# Patient Record
Sex: FEMALE | Race: WHITE | NOT HISPANIC OR LATINO | URBAN - METROPOLITAN AREA
[De-identification: names, ages, dates, MRNs, and addresses within clinical notes are randomized per-mention and may not be internally consistent; named-entity substitution may affect disease eponyms.]

---

## 2017-06-20 ENCOUNTER — OFFICE VISIT (OUTPATIENT)
Dept: SLEEP MEDICINE | Facility: CLINIC | Age: 37
End: 2017-06-20
Attending: INTERNAL MEDICINE
Payer: COMMERCIAL

## 2017-06-20 VITALS
OXYGEN SATURATION: 97 % | SYSTOLIC BLOOD PRESSURE: 139 MMHG | RESPIRATION RATE: 20 BRPM | BODY MASS INDEX: 47.09 KG/M2 | WEIGHT: 293 LBS | DIASTOLIC BLOOD PRESSURE: 86 MMHG | HEART RATE: 74 BPM | HEIGHT: 66 IN

## 2017-06-20 DIAGNOSIS — E66.01 MORBID OBESITY, UNSPECIFIED OBESITY TYPE (H): Primary | ICD-10-CM

## 2017-06-20 DIAGNOSIS — G47.33 OSA ON CPAP: ICD-10-CM

## 2017-06-20 PROCEDURE — 99211 OFF/OP EST MAY X REQ PHY/QHP: CPT | Mod: ZF

## 2017-06-20 RX ORDER — VENLAFAXINE HYDROCHLORIDE 150 MG/1
150 TABLET, EXTENDED RELEASE ORAL
COMMUNITY

## 2017-06-20 NOTE — MR AVS SNAPSHOT
After Visit Summary   6/20/2017    Daja Tony    MRN: [id number]           Patient Information     Date Of Birth          1980        Visit Information        Provider Department      6/20/2017 10:00 AM Isi Harper MD [facility] [place], Sleep Study        Today's Diagnoses     Morbid obesity, unspecified obesity type (H)    -  1    AGUSTINA on CPAP          Care Instructions      Your BMI is Body mass index is 50.52 kg/(m^2).  Weight management is a personal decision.  If you are interested in exploring weight loss strategies, the following discussion covers the approaches that may be successful. Body mass index (BMI) is one way to tell whether you are at a healthy weight, overweight, or obese. It measures your weight in relation to your height.  A BMI of 18.5 to 24.9 is in the healthy range. A person with a BMI of 25 to 29.9 is considered overweight, and someone with a BMI of 30 or greater is considered obese. More than two-thirds of American adults are considered overweight or obese.  Being overweight or obese increases the risk for further weight gain. Excess weight may lead to heart disease and diabetes.  Creating and following plans for healthy eating and physical activity may help you improve your health.  Weight control is part of healthy lifestyle and includes exercise, emotional health, and healthy eating habits. Careful eating habits lifelong are the mainstay of weight control. Though there are significant health benefits from weight loss, long-term weight loss with diet alone may be very difficult to achieve- studies show long-term success with dietary management in less than 10% of people. Attaining a healthy weight may be especially difficult to achieve in those with severe obesity. In some cases, medications, devices and surgical management might be considered.  What can you do?  If you are overweight or obese and are interested in methods for weight loss,  you should discuss this with your provider.     Consider reducing daily calorie intake by 500 calories.     Keep a food journal.     Avoiding skipping meals, consider cutting portions instead.    Diet combined with exercise helps maintain muscle while optimizing fat loss. Strength training is particularly important for building and maintaining muscle mass. Exercise helps reduce stress, increase energy, and improves fitness. Increasing exercise without diet control, however, may not burn enough calories to loose weight.       Start walking three days a week 10-20 minutes at a time    Work towards walking thirty minutes five days a week     Eventually, increase the speed of your walking for 1-2 minutes at time    In addition, we recommend that you review healthy lifestyles and methods for weight loss available through the National Institutes of Health patient information sites:  http://win.niddk.nih.gov/publications/index.htm    And look into health and wellness programs that may be available through your health insurance provider, employer, local community center, or shavonne club.    Weight management plan: Patient was referred to their PCP to discuss a diet and exercise plan.   Insomnia - Delayed Sleep Phase  Each of us has a built in tendency to find it easier to stay up late at night or get up early in the morning.  Being a  night owl  or  early bird  is a function of our internal body clock.  When you are forced to keep a sleep schedule that goes against your typical habits (because a job or other responsibilities) insomnia can be the result.  Try the following changes to see if you can improve your sleep patterns:    Pick a sleep and wake schedule with about 7-8 hours in bed that is consistent.  That means keep the same bedtime and rise time every day of the week including the weekends, for the next 4-6 weeks    Try to get outside for about 30 minutes after you get up in the morning if the sun is out.  Sunlight is  the best way to  set  your internal body clock      Take melatonin - 1 mg about 5 hours before your habitual bedtime     Please keep a sleep log or diary during this time to track your sleep patterns  Please do not drive if drowsy or sleepy;  pull over if drowsy.              Follow-ups after your visit        Additional Services     BARIATRIC ADULT REFERRAL       Your provider has referred you to: [facility]: Lifestyle Medicine Program for Weight Management [facility] [phone]   [email or web address]    Please be aware that coverage of these services is subject to the terms and limitations of your health insurance plan.  Call member services at your health plan with any benefit or coverage questions.      Please bring the following with you to your appointment:      (1) List of current medications   (2) This referral request   (3) Any documents/labs given to you for this referral                  Follow-up notes from your care team     Return in about 1 year (around 6/20/2018).      Your next 10 appointments already scheduled     Jun 26, 2017 11:30 AM CDT   Oximetry  with SLEEP STUDY RM 7   [facility]    [address] [place] [id number] [phone]              Future tests that were ordered for you today     Open Future Orders        Priority Expected Expires Ordered    Overnight oximetry study Routine  12/17/2017 6/20/2017            Who to contact     If you have questions or need follow up information about today's clinic visit or your schedule please contact [facility] directly at [phone].  Normal or non-critical lab and imaging results will be communicated to you by MyChart, letter or phone within 4 business days after the clinic has received the results. If you do not hear from us within 7 days, please contact the clinic through  "[employer] or phone. If you have a critical or abnormal lab result, we will notify you by phone as soon as possible.  Submit refill requests through [employer] or call your pharmacy and they will forward the refill request to us. Please allow 3 business days for your refill to be completed.          Additional Information About Your Visit        [employer] Information     [employer] lets you send messages to your doctor, view your test results, renew your prescriptions, schedule appointments and more. To sign up, go to [email or web address] . Click on \"Log in\" on the left side of the screen, which will take you to the Welcome page. Then click on \"Sign up Now\" on the right side of the page.     You will be asked to enter the access code listed below, as well as some personal information. Please follow the directions to create your username and password.     Your access code is: [email or web address]  Expires: 2017 10:55 PM     Your access code will  in 90 days. If you need help or a new code, please call your [place] clinic or [phone].        Care EveryWhere ID     This is your Care EveryWhere ID. This could be used by other organizations to access your [place] medical records  [id number]        Your Vitals Were     Pulse Respirations Height Pulse Oximetry BMI (Body Mass Index)       74 20 1.676 m (5' 6\") 97% 50.52 kg/m2        Blood Pressure from Last 3 Encounters:   17 139/86   05/18/15 131/79   03/04/15 124/84    Weight from Last 3 Encounters:   17 (!) 142 kg (313 lb)   03/04/15 130.6 kg (288 lb)   12 117.9 kg (260 lb)              We Performed the Following     BARIATRIC ADULT REFERRAL     Comprehensive DME          Today's Medication Changes          These changes are accurate as of: 17 10:55 PM.  If you have any questions, ask your nurse or doctor.               Stop taking these medicines if you haven't already. Please contact your care team if you have questions.     cholecalciferol " 83944 UNITS capsule   Commonly known as:  VITAMIN D3           fluticasone 50 MCG/ACT spray   Commonly known as:  FLONASE           PROZAC PO                    Primary Care Provider Office Phone # Fax #    Brian Wilkins [phone]       [facility] [address]  [facility] [address]        Thank you!     Thank you for choosing [facility] [place], SLEEP STUDY  for your care. Our goal is always to provide you with excellent care. Hearing back from our patients is one way we can continue to improve our services. Please take a few minutes to complete the written survey that you may receive in the mail after your visit with us. Thank you!             Your Updated Medication List - Protect others around you: Learn how to safely use, store and throw away your medicines at www.disposemymeds.org.          This list is accurate as of: 6/20/17 10:55 PM.  Always use your most recent med list.                   Brand Name Dispense Instructions for use    omeprazole 20 MG tablet      Take 40 mg by mouth daily       order for [facility]      Respironics Auto CPAP 8-15 cm H20.       venlafaxine 150 MG Tb24 24 hr tablet    EFFEXOR-ER     Take 150 mg by mouth daily (with breakfast)       XANAX PO      Take 1 mg by mouth daily

## 2017-06-20 NOTE — NURSING NOTE
"Chief Complaint   Patient presents with     Sleep Problem       Initial /86  Pulse 74  Resp 20  Ht 1.676 m (5' 6\")  Wt (!) 142 kg (313 lb)  SpO2 97%  BMI 50.52 kg/m2 Estimated body mass index is 50.52 kg/(m^2) as calculated from the following:    Height as of this encounter: 1.676 m (5' 6\").    Weight as of this encounter: 142 kg (313 lb).  Medication Reconciliation: complete     Drew CMA      "

## 2017-06-20 NOTE — PATIENT INSTRUCTIONS
Your BMI is Body mass index is 50.52 kg/(m^2).  Weight management is a personal decision.  If you are interested in exploring weight loss strategies, the following discussion covers the approaches that may be successful. Body mass index (BMI) is one way to tell whether you are at a healthy weight, overweight, or obese. It measures your weight in relation to your height.  A BMI of 18.5 to 24.9 is in the healthy range. A person with a BMI of 25 to 29.9 is considered overweight, and someone with a BMI of 30 or greater is considered obese. More than two-thirds of American adults are considered overweight or obese.  Being overweight or obese increases the risk for further weight gain. Excess weight may lead to heart disease and diabetes.  Creating and following plans for healthy eating and physical activity may help you improve your health.  Weight control is part of healthy lifestyle and includes exercise, emotional health, and healthy eating habits. Careful eating habits lifelong are the mainstay of weight control. Though there are significant health benefits from weight loss, long-term weight loss with diet alone may be very difficult to achieve- studies show long-term success with dietary management in less than 10% of people. Attaining a healthy weight may be especially difficult to achieve in those with severe obesity. In some cases, medications, devices and surgical management might be considered.  What can you do?  If you are overweight or obese and are interested in methods for weight loss, you should discuss this with your provider.     Consider reducing daily calorie intake by 500 calories.     Keep a food journal.     Avoiding skipping meals, consider cutting portions instead.    Diet combined with exercise helps maintain muscle while optimizing fat loss. Strength training is particularly important for building and maintaining muscle mass. Exercise helps reduce stress, increase energy, and improves fitness.  Increasing exercise without diet control, however, may not burn enough calories to loose weight.       Start walking three days a week 10-20 minutes at a time    Work towards walking thirty minutes five days a week     Eventually, increase the speed of your walking for 1-2 minutes at time    In addition, we recommend that you review healthy lifestyles and methods for weight loss available through the National Institutes of Health patient information sites:  http://win.niddk.nih.gov/publications/index.htm    And look into health and wellness programs that may be available through your health insurance provider, employer, local community center, or [employer] club.    Weight management plan: Patient was referred to their PCP to discuss a diet and exercise plan.   Insomnia - Delayed Sleep Phase  Each of us has a built in tendency to find it easier to stay up late at night or get up early in the morning.  Being a  night owl  or  early bird  is a function of our internal body clock.  When you are forced to keep a sleep schedule that goes against your typical habits (because a job or other responsibilities) insomnia can be the result.  Try the following changes to see if you can improve your sleep patterns:    Pick a sleep and wake schedule with about 7-8 hours in bed that is consistent.  That means keep the same bedtime and rise time every day of the week including the weekends, for the next 4-6 weeks    Try to get outside for about 30 minutes after you get up in the morning if the sun is out.  Sunlight is the best way to  set  your internal body clock      Take melatonin   1 mg about 5 hours before your habitual bedtime     Please keep a sleep log or diary during this time to track your sleep patterns  Please do not drive if drowsy or sleepy;  pull over if drowsy.

## 2017-06-21 NOTE — PROGRESS NOTES
"Sleep Medicine follow up visit note    Date of visit: 17.    CHIEF COMPLAINT: f/u AGUSTINA; CPAP return visit    HPI: Ms. Daja Tony  is a 36 year old  female with AGUSTINA who is at Sleep clinic for CPAP return visit. She was diagnosed with moderate AGUSTINA, AHI 20.2 per hour (no REM during baseline study) after PSG obtained in 3/2015 (she then weighed 288 lbs).  She was subsequently prescribed auto CPAP device pressure settings 8-11 cm of water.  She reports using the CPAP device regularly during sleep ; she  denies any reports of  snoring or  awakenings due to  gasping for air while using the device. She  reports occasional air hunger when she puts the mask on.   She reports  insufficient sleep during work nights and sleep extension during weekends. She denies fatigue/EDS. She occasionally naps during day when she uses CPAP. She denies concerns about drowsiness while driving.  She denies any hospitalizations since she was last seen at Sleep clinic due to Cardiovasular  Disease.    DOWNLOADABLE COMPLIANCE DATA:   From 17 through 17, reveals that she used the device for 30/30 days with an average use of 7 hours and 45 minutes on the days used. Mean pressure:10.3 and 90 th percentile pressure settin.0 cm of water.  Residual AHI was 1.0 per hr.      Current meds, Past medical history, Past surgical history, Allergies, Social history, Family history: reviewed, per EMR      PHYSICAL EXAM: /86  Pulse 74  Resp 20  Ht 1.676 m (5' 6\")  Wt (!) 142 kg (313 lb)  SpO2 97%  BMI 50.52 kg/m2                               General: obese female, pleasant, in no apparent distress                               CVS: regular S1 and S2; no gallops/murmurs                               Resp: vesicular breath sounds; no rales/rhonchi                               Extremities: no pedal edema               ASSESSMENT/PLAN:  1. Moderate AGUSTINA:  Pt reports adequate compliance with CPAP and reports positive benefits from " "continued CPAP usage and based on the compliance DL, the AGUSTINA is adequately controlled with  Auto CPAP at the current settings. Based on the weight gain of 25 lbs since the sleep study and with  reports air hunger, recommended revising the  pressure settings on her Auto CPAP as follows:  min pressure=10 and max pressure=14 cm of water.  She  was instructed to call us if she has any trouble tolerating the revised pressure settings. Prescription was provided for renewal of the CPAP supplies. Recommend obtaining an overnight oximetry with the auto CPAP with revised pressure settings. The results of the oximetry will be communicated to her via phone. If the oximetry is normal, plan is for her to continue using the device regularly during sleep  and to get the supplies replaced  regularly.   2. Delayed sleep phase and insufficient sleep during some week nights. We discussed  consequences of chronic sleep deprivation, we also discussed about regularizing sleep wake schedule, keeping it same every day of the week, aim at obtaining at least 7-8 hours of sleep per night and avoid sleep deprivation. We also  discussed bright light exposure in the morning.  3. Obesity: We discussed weight management with diet and exercise and provided referral to weight loss center in which she was  Interested.  4. Her blood pressure was noted to be elevated.  She  has been advised to follow up with her primary provider.  Patient was counseled on avoiding driving /oprerating heavy machinery  if drowsy or sleepy.  F/U in 1 year or sooner if any concerns.    \"I spent a total of 25 minutes face to face with Daja Tony during today's office visit. Over 50% of this time was spent counseling the patient and coordinating care regarding sleep apnea, regularizing sleep schedule, increasing duration of sleep and weight management.\"    Isi Harper MD   of Medicine,  Division of Pulmonary/Sleep " [facility]  .

## 2017-10-25 ENCOUNTER — NURSE TRIAGE (OUTPATIENT)
Dept: NURSING | Facility: CLINIC | Age: 37
End: 2017-10-25

## 2017-10-25 ENCOUNTER — OFFICE VISIT (OUTPATIENT)
Dept: URGENT CARE | Facility: URGENT CARE | Age: 37
End: 2017-10-25
Payer: COMMERCIAL

## 2017-10-25 VITALS
HEART RATE: 70 BPM | RESPIRATION RATE: 20 BRPM | SYSTOLIC BLOOD PRESSURE: 130 MMHG | TEMPERATURE: 98.5 F | OXYGEN SATURATION: 96 % | BODY MASS INDEX: 50.52 KG/M2 | DIASTOLIC BLOOD PRESSURE: 92 MMHG | WEIGHT: 293 LBS

## 2017-10-25 DIAGNOSIS — R05.9 COUGH: Primary | ICD-10-CM

## 2017-10-25 PROCEDURE — 99203 OFFICE O/P NEW LOW 30 MIN: CPT | Performed by: FAMILY MEDICINE

## 2017-10-25 RX ORDER — CODEINE PHOSPHATE AND GUAIFENESIN 10; 100 MG/5ML; MG/5ML
1-2 SOLUTION ORAL EVERY 6 HOURS PRN
Qty: 120 ML | Refills: 0 | Status: SHIPPED | OUTPATIENT
Start: 2017-10-25

## 2017-10-25 RX ORDER — BENZONATATE 100 MG/1
200 CAPSULE ORAL 3 TIMES DAILY PRN
Qty: 42 CAPSULE | Refills: 3 | Status: SHIPPED | OUTPATIENT
Start: 2017-10-25

## 2017-10-25 RX ORDER — AZITHROMYCIN 250 MG/1
TABLET, FILM COATED ORAL
Qty: 6 TABLET | Refills: 0 | Status: SHIPPED | OUTPATIENT
Start: 2017-10-25

## 2017-10-25 NOTE — NURSING NOTE
"Chief Complaint   Patient presents with     Urgent Care     Cough     persistent cough x 4 days, sob, chest tightness         Initial BP (!) 130/92 (BP Location: Right arm, Patient Position: Chair, Cuff Size: Adult Large)  Pulse 70  Temp 98.5  F (36.9  C) (Oral)  Resp 20  Wt (!) 313 lb (142 kg)  SpO2 96%  BMI 50.52 kg/m2 Estimated body mass index is 50.52 kg/(m^2) as calculated from the following:    Height as of 6/20/17: 5' 6\" (1.676 m).    Weight as of this encounter: 313 lb (142 kg).  Medication Reconciliation: unable or not appropriate to perform   Gita Rodas Medical Assistant      "

## 2017-10-25 NOTE — TELEPHONE ENCOUNTER
Asking if the [facility] does chest X-rays/ called and confirmed that they do/  Declined triage    Ivan Santos RN [phone]

## 2017-10-25 NOTE — PATIENT INSTRUCTIONS
Steam, Humidifier    I recommend that you quit smoking in the future.      follow up with the primary care provider if not better in 5-6 days.     Go to the emergency room if there is worsening shortness of breath.

## 2017-10-25 NOTE — MR AVS SNAPSHOT
"              After Visit Summary   10/25/2017    Daja Tony    MRN: [id number]           Patient Information     Date Of Birth          1980        Visit Information        Provider Department      10/25/2017 11:05 AM Ron Zaragoza MD [facility]        Today's Diagnoses     Cough    -  1      Care Instructions    Steam, Humidifier    I recommend that you quit smoking in the future.      follow up with the primary care provider if not better in 5-6 days.     Go to the emergency room if there is worsening shortness of breath.               Follow-ups after your visit        Who to contact     If you have questions or need follow up information about today's clinic visit or your schedule please contact [facility] directly at [phone].  Normal or non-critical lab and imaging results will be communicated to you by [employer], letter or phone within 4 business days after the clinic has received the results. If you do not hear from us within 7 days, please contact the clinic through [employer] or phone. If you have a critical or abnormal lab result, we will notify you by phone as soon as possible.  Submit refill requests through [employer] or call your pharmacy and they will forward the refill request to us. Please allow 3 business days for your refill to be completed.          Additional Information About Your Visit        [employer] Information     [employer] lets you send messages to your doctor, view your test results, renew your prescriptions, schedule appointments and more. To sign up, go to [email or web address] . Click on \"Log in\" on the left side of the screen, which will take you to the Welcome page. Then click on \"Sign up Now\" on the right side of the page.     You will be asked to enter the access code listed below, as well as some personal information. Please follow the directions to create your username and password.     Your access code is: [email or web address]  Expires: 1/23/2018 " 12:13 PM     Your access code will  in 90 days. If you need help or a new code, please call your [place] clinic or [phone].        Care EveryWhere ID     This is your Care EveryWhere ID. This could be used by other organizations to access your [place] medical records  [id number]        Your Vitals Were     Pulse Temperature Respirations Pulse Oximetry BMI (Body Mass Index)       70 98.5  F (36.9  C) (Oral) 20 96% 50.52 kg/m2        Blood Pressure from Last 3 Encounters:   10/25/17 (!) 130/92   17 139/86   05/18/15 131/79    Weight from Last 3 Encounters:   10/25/17 (!) 313 lb (142 kg)   17 (!) 313 lb (142 kg)   03/04/15 288 lb (130.6 kg)              Today, you had the following     No orders found for display         Today's Medication Changes          These changes are accurate as of: 10/25/17 12:13 PM.  If you have any questions, ask your nurse or doctor.               Start taking these medicines.        Dose/Directions    azithromycin 250 MG tablet   Commonly known as:  ZITHROMAX   Used for:  Cough   Started by:  Ron Zaragoza MD        Two tablets first day, then one tablet daily for four days.   Quantity:  6 tablet   Refills:  0       benzonatate 100 MG capsule   Commonly known as:  TESSALON   Used for:  Cough   Started by:  Ron Zaragoza MD        Dose:  200 mg   Take 2 capsules (200 mg) by mouth 3 times daily as needed   Quantity:  42 capsule   Refills:  3       guaiFENesin-codeine 100-10 MG/5ML Soln solution   Commonly known as:  ROBITUSSIN AC   Used for:  Cough   Started by:  Ron Zaragoza MD        Dose:  1-2 tsp.   Take 5-10 mLs by mouth every 6 hours as needed   Quantity:  120 mL   Refills:  0            Where to get your medicines      [facility] [address] [place] [address] & [address] [address]    Hours:  24-hours Phone:  [phone]     azithromycin 250 MG tablet     benzonatate 100 MG capsule         Some of these will need a paper prescription and others can be bought over the counter.  Ask your nurse if you have questions.     Bring a paper prescription for each of these medications     guaiFENesin-codeine 100-10 MG/5ML Soln solution                Primary Care Provider Office Phone # Fax #    Brian Wilkins [phone]       [facility] [address] [address]        Equal Access to Services     [facility] : Hadii aad ku hadasho Soomaali, waaxda luqadaha, qaybta kaalmada adeegyada, waxay idiin hayaan adeeg khrenettalinnea la'lynsey . So [facility] [phone].    ATENCIÓN: Si habla español, tiene a henriquez disposición servicios gratuitos de asistencia lingüística. [facility] [phone].    We comply with applicable federal civil rights laws and [place] laws. We do not discriminate on the basis of race, color, national origin, age, disability, sex, sexual orientation, or gender identity.            Thank you!     Thank you for choosing [facility]  for your care. Our goal is always to provide you with excellent care. Hearing back from our patients is one way we can continue to improve our services. Please take a few minutes to complete the written survey that you may receive in the mail after your visit with us. Thank you!             Your Updated Medication List - Protect others around you: Learn how to safely use, store and throw away your medicines at www.disposemymeds.org.          This list is accurate as of: 10/25/17 12:13 PM.  Always use your most recent med list.                   Brand Name Dispense Instructions for use Diagnosis    albuterol 108 (90 BASE) MCG/ACT Inhaler    PROAIR HFA/PROVENTIL HFA/VENTOLIN HFA     Inhale 2 puffs into the lungs every 6 hours        azithromycin 250 MG tablet    ZITHROMAX    6 tablet    Two tablets first day, then one tablet daily for four days.    Cough       benzonatate 100 MG  capsule    TESSALON    42 capsule    Take 2 capsules (200 mg) by mouth 3 times daily as needed    Cough       guaiFENesin-codeine 100-10 MG/5ML Soln solution    ROBITUSSIN AC    120 mL    Take 5-10 mLs by mouth every 6 hours as needed    Cough       omeprazole 20 MG tablet      Take 40 mg by mouth daily        order for DME      Respironics Auto CPAP 8-15 cm H20.        venlafaxine 150 MG Tb24 24 hr tablet    EFFEXOR-ER     Take 150 mg by mouth daily (with breakfast)        XANAX PO      Take 1 mg by mouth daily

## 2017-10-25 NOTE — PROGRESS NOTES
SUBJECTIVE:   Daja Tony is a 36 year old female who is a smoker.  She is presenting with a chief complaint of cough (sometimes productive of dark green thick phlegm, especially in the morning, severe coughing attacks often interfering with sleep) for the past four days, shortness of breath (can't breathe deeply) and worseningchest tightness for the past couple days.  Onset of symptoms was three days ago.  Course of illness is still present. .    Current and Associated symptoms: as listed above.   Treatment measures tried include Albuterol MDI (started three days ago). .  Predisposing factors include Patient has smoked since the age of 17 years. .    Patient was evaluated at the [facility] three days ago.  Patient was evaluated for cough.  Patient was given Rx's for Amoxicillin for an ear infection and a Rx for Albuterol MDI.  Patient has noticed temporary improvement for about the first 30-40 minutes after taking the Albuterol MDI    Past Medical History:   Diagnosis Date     Anxiety      Depressive disorder      Current Outpatient Prescriptions   Medication Sig Dispense Refill     albuterol (PROAIR HFA/PROVENTIL HFA/VENTOLIN HFA) 108 (90 BASE) MCG/ACT Inhaler Inhale 2 puffs into the lungs every 6 hours       venlafaxine (EFFEXOR-ER) 150 MG TB24 24 hr tablet Take 150 mg by mouth daily (with breakfast)       ORDER FOR [facility] Respironics Auto CPAP 8-15 cm H20.       omeprazole 20 MG tablet Take 40 mg by mouth daily        ALPRAZolam (XANAX PO) Take 1 mg by mouth daily        Social History   Substance Use Topics     Smoking status: Current Some Day Smoker     Types: Cigarettes     Smokeless tobacco: Never Used      Comment: Smokes rarely     Alcohol use Yes       ROS:  Review of systems negative except as stated above.    OBJECTIVE:  BP (!) 130/92 (BP Location: Right arm, Patient Position: Chair, Cuff Size: Adult Large)  Pulse 70  Temp 98.5  F (36.9  C) (Oral)  Resp 20  Wt (!) 313 lb (142 kg)   SpO2 96%  BMI 50.52 kg/m2  GENERAL APPEARANCE: healthy, alert and no distress  HENT: ear canals and TM's normal.  Nose and mouth without ulcers, erythema or lesions  NECK: supple, nontender, no lymphadenopathy  RESP: lungs clear to auscultation - no rales, rhonchi or wheezes  CV: regular rates and rhythm, normal S1 S2, no murmur noted  SKIN: no suspicious lesions or rashes    ASSESSMENT:  Cough    PLAN:  Rx:  Azithromycin, Tessalon Perles, Cheratussin AC  Continue the Albuterol MDI  follow up with the primary care provider if not better in 5-6 days.   Go to the emergency room if there is worsening shortness of breath   See orders in Epic  I recommended that the patient quit smoking in the future.     Ron Zaragoza MD

## 2018-03-19 DIAGNOSIS — G47.33 OBSTRUCTIVE SLEEP APNEA OF ADULT: Primary | ICD-10-CM

## 2018-07-03 ENCOUNTER — NURSE TRIAGE (OUTPATIENT)
Dept: NURSING | Facility: CLINIC | Age: 38
End: 2018-07-03

## 2018-07-03 NOTE — TELEPHONE ENCOUNTER
"Patient calling stating \" burning sensation for three days\" in her abdomen. Pain intermittent. Rate pain 4/10. No vomiting. Took antacid with minimal relief. Has loose stool.   Per guideline advised patient be seen within 24hours.   Caller verbalized understanding. Denies further questions.      Cyrus Velasco RN  [facility]       Reason for Disposition    [1] MODERATE pain (e.g., interferes with normal activities) AND [2] comes and goes (cramps) AND [3] present > 24 hours  (Exception: pain with Vomiting or Diarrhea - see that Guideline)    Additional Information    Negative: Shock suspected (e.g., cold/pale/clammy skin, too weak to stand, low BP, rapid pulse)    Negative: Difficult to awaken or acting confused  (e.g., disoriented, slurred speech)    Negative: Passed out (i.e., lost consciousness, collapsed and was not responding)    Negative: Sounds like a life-threatening emergency to the triager    Negative: Chest pain    Pain is mainly in upper abdomen  (if needed ask: \"is it mainly above the belly button?\")    Negative: Severe difficulty breathing (e.g., struggling for each breath, speaks in single words)    Negative: Shock suspected (e.g., cold/pale/clammy skin, too weak to stand, low BP, rapid pulse)    Negative: Difficult to awaken or acting confused  (e.g., disoriented, slurred speech)    Negative: Passed out (i.e., lost consciousness, collapsed and was not responding)    Negative: Visible sweat on face or sweat dripping down face    Negative: Sounds like a life-threatening emergency to the triager    Negative: Followed an abdomen (stomach) injury    Negative: Chest pain    Negative: [1] SEVERE pain (e.g., excruciating) AND [2] present > 1 hour    Negative: [1] Pain lasts > 10 minutes AND [2] age > 50    Negative: [1] Pain lasts > 10 minutes AND [2] age > 40 AND [3] associated chest, arm, neck, upper back or jaw pain    Negative: [1] Pain lasts > 10 minutes AND [2] age > 35 AND [3] at least one " "cardiac risk factor (i.e., hypertension, diabetes, obesity, smoker or strong family history of heart disease)    Negative: [1] Pain lasts > 10 minutes AND [2] history of heart disease (i.e., heart attack, bypass surgery, angina, angioplasty, CHF; not just a heart murmur)    Negative: [1] Pain lasts > 10 minutes AND [2] difficulty breathing    Negative: [1] Vomiting AND [2] contains red blood  (Exception: few streaks and only occurred once)    Negative: [1] Vomiting AND [2] contains black (\"coffee ground\") material    Negative: Blood in bowel movements  (Exception: Blood on surface of BM with constipation)    Negative: Black or tarry bowel movements  (Exception: chronic-unchanged  black-grey bowel movements AND is taking iron pills or Pepto-bismol)    Negative: [1] Pregnant > 24 weeks AND [2] hand or face swelling    Negative: Patient sounds very sick or weak to the triager    Negative: [1] MILD-MODERATE pain AND [2] constant AND [3] present > 2 hours    Negative: [1] MILD-MODERATE pain AND [2] not relieved by antacids    Negative: [1] Vomiting AND [2] contains bile (green color)    Negative: [1] Vomiting AND [2] abdomen looks much more swollen than usual    Negative: White of the eyes have turned yellow (i.e., jaundice)    Negative: Fever > 103 F (39.4 C)    Negative: [1] Fever > 101 F (38.3 C) AND [2] age > 60    Negative: [1] Fever > 101 F (38.3 C) AND [2] bedridden (e.g., nursing home patient, CVA, chronic illness, recovering from surgery)    Negative: [1] Fever > 100.5 F (38.1 C) AND [2] diabetes mellitus or weak immune system (e.g., HIV positive, cancer chemo, splenectomy, organ transplant, chronic steroids)    Protocols used: ABDOMINAL PAIN - UPPER-ADULT-AH, ABDOMINAL PAIN - FEMALE-ADULT-AH      "

## 2019-03-26 DIAGNOSIS — G47.33 OBSTRUCTIVE SLEEP APNEA (ADULT) (PEDIATRIC): Primary | ICD-10-CM

## 2019-04-11 NOTE — PROGRESS NOTES
Orders sent to [facility]    Nichole Grijalva [facility] ~[place]

## 2020-07-29 ENCOUNTER — AMBULATORY - HEALTHEAST (OUTPATIENT)
Dept: SURGERY | Facility: CLINIC | Age: 40
End: 2020-07-29

## 2020-07-29 DIAGNOSIS — Z11.59 ENCOUNTER FOR SCREENING FOR OTHER VIRAL DISEASES: ICD-10-CM

## 2020-08-10 ASSESSMENT — MIFFLIN-ST. JEOR: SCORE: 2218.11

## 2020-08-14 ENCOUNTER — ANESTHESIA - HEALTHEAST (OUTPATIENT)
Dept: SURGERY | Facility: CLINIC | Age: 40
End: 2020-08-14

## 2020-08-14 ENCOUNTER — AMBULATORY - HEALTHEAST (OUTPATIENT)
Dept: FAMILY MEDICINE | Facility: CLINIC | Age: 40
End: 2020-08-14

## 2020-08-14 DIAGNOSIS — Z11.59 ENCOUNTER FOR SCREENING FOR OTHER VIRAL DISEASES: ICD-10-CM

## 2020-08-17 ENCOUNTER — SURGERY - HEALTHEAST (OUTPATIENT)
Dept: SURGERY | Facility: CLINIC | Age: 40
End: 2020-08-17

## 2020-08-17 ENCOUNTER — COMMUNICATION - HEALTHEAST (OUTPATIENT)
Dept: SCHEDULING | Facility: CLINIC | Age: 40
End: 2020-08-17

## 2021-06-04 VITALS — WEIGHT: 293 LBS | HEIGHT: 65 IN | BODY MASS INDEX: 48.82 KG/M2

## 2021-06-10 NOTE — ANESTHESIA PREPROCEDURE EVALUATION
Anesthesia Evaluation      Patient summary reviewed   No history of anesthetic complications     Airway   Mallampati: III  Neck ROM: full   Pulmonary     breath sounds clear to auscultation  (+) asthma  mild,  well controlled, sleep apnea on CPAP, , a smoker                         Cardiovascular   (-) hypertension  Rhythm: regular  Rate: normal,      ROS comment: Limited mobility since injury, prior to completed > 4 METs     Neuro/Psych    (+) depression, anxiety/panic attacks,   (-) no seizures    Endo/Other    (+) obesity (BMI 56.5),   (-) no diabetes, hypothyroidism, not pregnant     GI/Hepatic/Renal    (+) GERD well controlled,        Other findings:   Covid negative 8/14          Dental - normal exam                        Anesthesia Plan  Planned anesthetic: general endotracheal  Ramp patient, Glidescope  Propofol gtt background   Acetaminophen 1 g (pre-op), Ketamine 50 mg on induction, ketorolac 15 mg if ok w/ surgeon  Scopolamine, dexamethasone 10 mg, ondansetron 4 mg   ASA 3   Induction: intravenous   Anesthetic plan and risks discussed with: patient and parent/guardian  Anesthesia plan special considerations: video-assisted, antiemetics,   Post-op plan: routine recovery

## 2021-06-10 NOTE — ANESTHESIA CARE TRANSFER NOTE
Last vitals:   Vitals:    08/17/20 0828   BP: (!) 164/94   Pulse: 94   Resp: 16   Temp: 36.7  C (98  F)   SpO2: 100%     Patient's level of consciousness is awake and drowsy  Spontaneous respirations: yes  Maintains airway independently: yes  Dentition unchanged: yes  Oropharynx: oropharynx clear of all foreign objects    QCDR Measures:  ASA# 20 - Surgical Safety Checklist: WHO surgical safety checklist completed prior to induction    PQRS# 430 - Adult PONV Prevention: 4558F - Pt received => 2 anti-emetic agents (different classes) preop & intraop  ASA# 8 - Peds PONV Prevention: NA - Not pediatric patient, not GA or 2 or more risk factors NOT present  PQRS# 424 - Dayana-op Temp Management: 4559F - At least one body temp DOCUMENTED => 35.5C or 95.9F within required timeframe  PQRS# 426 - PACU Transfer Protocol: - Transfer of care checklist used  ASA# 14 - Acute Post-op Pain: ASA14B - Patient did NOT experience pain >= 7 out of 10

## 2021-06-10 NOTE — ANESTHESIA POSTPROCEDURE EVALUATION
Patient: Daja Tony  Procedure(s):  LEFT KNEE ARTHROSCOPY WITH PARTIAL MEDIAL MENISCECTOMY (Left)  Anesthesia type: general    Patient location: PACU  Last vitals:   Vitals Value Taken Time   /65 8/17/2020 10:00 AM   Temp 36  C (96.8  F) 8/17/2020 10:00 AM   Pulse 96 8/17/2020 10:01 AM   Resp 16 8/17/2020 10:00 AM   SpO2 95 % 8/17/2020 10:01 AM   Vitals shown include unvalidated device data.  Post vital signs: stable  Level of consciousness: awake and responds to simple questions  Post-anesthesia pain: pain controlled  Post-anesthesia nausea and vomiting: no  Pulmonary: unassisted, return to baseline  Cardiovascular: stable and blood pressure at baseline  Hydration: adequate  Anesthetic events: no    QCDR Measures:  ASA# 11 - Dayana-op Cardiac Arrest: ASA11B - Patient did NOT experience unanticipated cardiac arrest  ASA# 12 - Dayana-op Mortality Rate: ASA12B - Patient did NOT die  ASA# 13 - PACU Re-Intubation Rate: ASA13B - Patient did NOT require a new airway mgmt  ASA# 10 - Composite Anes Safety: ASA10A - No serious adverse event    Additional Notes:

## 2021-11-30 PROBLEM — F41.9 ANXIETY: Status: ACTIVE | Noted: 2021-11-30

## 2025-06-11 ENCOUNTER — HOSPITAL ENCOUNTER (EMERGENCY)
Facility: CLINIC | Age: 45
Discharge: HOME | End: 2025-06-11
Payer: COMMERCIAL

## 2025-06-11 VITALS
SYSTOLIC BLOOD PRESSURE: 142 MMHG | WEIGHT: 293 LBS | HEART RATE: 98 BPM | DIASTOLIC BLOOD PRESSURE: 67 MMHG | HEIGHT: 65 IN | OXYGEN SATURATION: 94 % | BODY MASS INDEX: 48.82 KG/M2 | TEMPERATURE: 98.5 F | RESPIRATION RATE: 19 BRPM

## 2025-06-11 DIAGNOSIS — I10 ELEVATED BLOOD PRESSURE READING WITH DIAGNOSIS OF HYPERTENSION: ICD-10-CM

## 2025-06-11 DIAGNOSIS — R00.2 PALPITATIONS: ICD-10-CM

## 2025-06-11 LAB
ANION GAP SERPL CALCULATED.3IONS-SCNC: 13 MMOL/L (ref 7–15)
ATRIAL RATE - MUSE: 117 BPM
BASOPHILS # BLD AUTO: 0.1 10E3/UL (ref 0–0.2)
BASOPHILS NFR BLD AUTO: 1 %
BUN SERPL-MCNC: 9.6 MG/DL (ref 6–20)
CALCIUM SERPL-MCNC: 9.2 MG/DL (ref 8.8–10.4)
CHLORIDE SERPL-SCNC: 99 MMOL/L (ref 98–107)
CREAT SERPL-MCNC: 0.82 MG/DL (ref 0.51–0.95)
DIASTOLIC BLOOD PRESSURE - MUSE: 88 MMHG
EGFRCR SERPLBLD CKD-EPI 2021: >90 ML/MIN/1.73M2
EOSINOPHIL # BLD AUTO: 0.5 10E3/UL (ref 0–0.7)
EOSINOPHIL NFR BLD AUTO: 3 %
ERYTHROCYTE [DISTWIDTH] IN BLOOD BY AUTOMATED COUNT: 13.1 % (ref 10–15)
GLUCOSE SERPL-MCNC: 105 MG/DL (ref 70–99)
HCO3 SERPL-SCNC: 26 MMOL/L (ref 22–29)
HCT VFR BLD AUTO: 44.1 % (ref 35–47)
HGB BLD-MCNC: 14.4 G/DL (ref 11.7–15.7)
IMM GRANULOCYTES # BLD: 0.1 10E3/UL
IMM GRANULOCYTES NFR BLD: 1 %
INTERPRETATION ECG - MUSE: NORMAL
LYMPHOCYTES # BLD AUTO: 3 10E3/UL (ref 0.8–5.3)
LYMPHOCYTES NFR BLD AUTO: 15 %
MAGNESIUM SERPL-MCNC: 2 MG/DL (ref 1.7–2.3)
MCH RBC QN AUTO: 27.5 PG (ref 26.5–33)
MCHC RBC AUTO-ENTMCNC: 32.7 G/DL (ref 31.5–36.5)
MCV RBC AUTO: 84 FL (ref 78–100)
MONOCYTES # BLD AUTO: 1.1 10E3/UL (ref 0–1.3)
MONOCYTES NFR BLD AUTO: 5 %
NEUTROPHILS # BLD AUTO: 15 10E3/UL (ref 1.6–8.3)
NEUTROPHILS NFR BLD AUTO: 76 %
NRBC # BLD AUTO: 0 10E3/UL
NRBC BLD AUTO-RTO: 0 /100
P AXIS - MUSE: 62 DEGREES
PLAT MORPH BLD: ABNORMAL
PLATELET # BLD AUTO: ABNORMAL 10*3/UL
POTASSIUM SERPL-SCNC: 3.5 MMOL/L (ref 3.4–5.3)
PR INTERVAL - MUSE: 148 MS
QRS DURATION - MUSE: 84 MS
QT - MUSE: 326 MS
QTC - MUSE: 454 MS
R AXIS - MUSE: 56 DEGREES
RBC # BLD AUTO: 5.23 10E6/UL (ref 3.8–5.2)
RBC MORPH BLD: ABNORMAL
SODIUM SERPL-SCNC: 138 MMOL/L (ref 135–145)
SYSTOLIC BLOOD PRESSURE - MUSE: 141 MMHG
T AXIS - MUSE: 45 DEGREES
TROPONIN T SERPL HS-MCNC: <6 NG/L
TSH SERPL DL<=0.005 MIU/L-ACNC: 1.19 UIU/ML (ref 0.3–4.2)
VARIANT LYMPHS BLD QL SMEAR: PRESENT
VENTRICULAR RATE- MUSE: 117 BPM
WBC # BLD AUTO: 19.7 10E3/UL (ref 4–11)

## 2025-06-11 PROCEDURE — 84484 ASSAY OF TROPONIN QUANT: CPT

## 2025-06-11 PROCEDURE — 84443 ASSAY THYROID STIM HORMONE: CPT

## 2025-06-11 PROCEDURE — 99284 EMERGENCY DEPT VISIT MOD MDM: CPT

## 2025-06-11 PROCEDURE — 250N000013 HC RX MED GY IP 250 OP 250 PS 637

## 2025-06-11 PROCEDURE — 83735 ASSAY OF MAGNESIUM: CPT

## 2025-06-11 PROCEDURE — 93005 ELECTROCARDIOGRAM TRACING: CPT | Performed by: EMERGENCY MEDICINE

## 2025-06-11 PROCEDURE — 36415 COLL VENOUS BLD VENIPUNCTURE: CPT

## 2025-06-11 PROCEDURE — 85041 AUTOMATED RBC COUNT: CPT

## 2025-06-11 PROCEDURE — 82310 ASSAY OF CALCIUM: CPT

## 2025-06-11 RX ORDER — ALPRAZOLAM 0.25 MG
1 TABLET ORAL ONCE
Status: COMPLETED | OUTPATIENT
Start: 2025-06-11 | End: 2025-06-11

## 2025-06-11 RX ADMIN — ALPRAZOLAM 1 MG: 0.25 TABLET ORAL at 18:45

## 2025-06-11 ASSESSMENT — ACTIVITIES OF DAILY LIVING (ADL)
ADLS_ACUITY_SCORE: 41
ADLS_ACUITY_SCORE: 41

## 2025-06-11 ASSESSMENT — COLUMBIA-SUICIDE SEVERITY RATING SCALE - C-SSRS
1. IN THE PAST MONTH, HAVE YOU WISHED YOU WERE DEAD OR WISHED YOU COULD GO TO SLEEP AND NOT WAKE UP?: NO
6. HAVE YOU EVER DONE ANYTHING, STARTED TO DO ANYTHING, OR PREPARED TO DO ANYTHING TO END YOUR LIFE?: NO
2. HAVE YOU ACTUALLY HAD ANY THOUGHTS OF KILLING YOURSELF IN THE PAST MONTH?: NO

## 2025-06-11 NOTE — ED TRIAGE NOTES
"Patient thinks she may have taken 2 Adarall today, she has been feeling \"bad\" all day, states high blood pressure today and HR, no pain. Feels as if her chest is palpitating. Also started on a supplement 2 days ago, through her psychiatrist, only took partial dose of Ozempic a few days ago and history of anxiety. She used to take Xanax but doesn't anymore.      Triage Assessment (Adult)       Row Name 06/11/25 0106          Triage Assessment    Airway WDL WDL        Respiratory WDL    Respiratory WDL WDL        Skin Circulation/Temperature WDL    Skin Circulation/Temperature WDL WDL        Cardiac WDL    Cardiac WDL WDL        Peripheral/Neurovascular WDL    Peripheral Neurovascular WDL WDL        Cognitive/Neuro/Behavioral WDL    Cognitive/Neuro/Behavioral WDL WDL                     "

## 2025-06-11 NOTE — ED PROVIDER NOTES
"Emergency Department Encounter  [facility] EMERGENCY ROOM  Daja Tony   AGE: 44 year old SEX: female  YOB: 1980  PCP: Brian Webster  MRN: [id number]      EVALUATION DATE & TIME: 6/11/2025  5:37 PM ; PCP: Brain Webster   ED PROVIDER: Allie Guzmán PA-C    CHIEF COMPLAINT: Hypertension      FINAL IMPRESSION       ICD-10-CM    1. Palpitations  R00.2       2. Elevated blood pressure reading with diagnosis of hypertension  I10           MEDICAL DECISION MAKING   44 year old female with a pertinent history of obesity, anxiety and depression, hypertension, prediabetes, and tobacco use presents to the ED for evaluation of palpitations, hypertension, generally feeling \"bad\" throughout the day today.  Started a supplement recommended by her psychiatrist 2 days ago, no other medication changes.  No chest pain or shortness of breath.  Does endorse history of panic attacks, states this feels somewhat similar but she has not had one in years.  Thinks she may have taken 2 tablets of her Adderall today, but unsure.  No syncope, headache, vision changes, chest pain, shortness of breath, or oliguria.    ED Course as of 06/11/25 1954 Wed Jun 11, 2025   1749 BP(!): 143/87   1800 BP: 123/70   1818 I met the patient and gathered initial history and performed my physical exam.  Hemodynamically stable, afebrile.  On exam, patient well-appearing and in no acute distress.  Alert and oriented without neurological deficits.  Heart rhythm and rate regular without arrhythmia, murmur, or third heart sound.  No lower extremity edema.  Radial pulses 2+ bilaterally.  Lungs CTAB without respiratory distress.  Abdomen nontender without pulsatile mass.  Plan for EKG, CBC, BMP, TSH. Doubt pregnancy complications including HELPP and preeclampsia given IUD in place. Will give patient 1mg alprazolam for feelings of anxiety.   1828 ECG 12-LEAD WITH MUSE (LHE)  Independently interpreted with " sinus tachycardia at 117 bpm. No evidence of acute ischemia or lethal dysrhythmia.   1919 Sodium: 138   1919 Potassium: 3.5   1919 TSH: 1.19   1919 Troponin T, High Sensitivity: <6   1919 GFR Estimate: >90  No acute renal insufficiency.   1922 I rechecked the patient and discussed results, discharge, and follow up. Patient reports mild improvement in symptoms. Questions were answered.    1952 Magnesium: 2.0   1954 CBC with platelets + differential  Called lab, preliminary hemoglobin 14.4. Final result to be released within 5 min.     Medications given today in the emergency department:  Medications   ALPRAZolam (XANAX) tablet 1 mg (1 mg Oral $Given 6/11/25 1845)     Assessment/Plan: Presentation consistent with elevated blood pressure reading with diagnosis of hypertension and palpitations. Patient is without escalating blood pressure measurements, progressive symptoms of target organ injury, or evolving clinical evidence of acute target organ injury that would indicate hospitalization. Short acting antihypertensive medication is not indicated today. Plan to discharge home with prompt primary care follow up for high blood pressure management and reevaluation for palpitations. Encouraged patient to continue antihypertensives and avoid caffeine and tobacco use. Indications for re-evaluation in the ER discussed.   Acutely serious/life threatening considerations: cardiac ischemia (no chest pain, non ischemic EKG), encephalopathy, SAH, and CVA (no neurological deficits or AMS), acute renal failure, pulmonary embolism ([place] low risk, no pleuritic chest pain or SOB), congestive heart failure (no signs of fluid overload), aortic dissection (no pulse deficit in extremities, no known aortic pathology), pneumothorax, myocarditis, pericarditis, pericardial effusion/tamponade    New prescriptions started at today's ED visit:   Discharge Medication List as of 6/11/2025  7:48 PM        Medical Decision Making    Consider  "proceeding with CT head imaging but do not think this is indicated given low concern for hemorrhage or ischemia.  Also considered CTA abd/pelvis to assess for vascular emergencies, specifically aortic aneurysm, but think unlikely given hemodynamic stability and no abdominal pain.    Discharge. No recommendations on prescription strength medication(s). See documentation for any additional details.    MIPS (CTPE, Dental pain, Will, Sinusitis, Asthma/COPD, Head Trauma): Not Applicable    SEPSIS: None    Not Applicable     HISTORY OF PRESENT ILLNESS   Patient information was obtained from: patient, significant other   Use of Intrepreter: N/A     Daja Tony is a 44 year old female with a pertinent history of obesity, anxiety and depression, hypertension, prediabetes, and tobacco use who presents to the ED by private vehicle with significant other for evaluation of hypertension.  Patient reports that all day today she has felt that her heart rate has been elevated and that has had sensation of palpitations and lightheadedness throughout the day.  When her significant other got home, she felt \"bad\" and took her blood pressure which was noted to be elevated.  Denies chest pain or shortness of breath.  No personal history of chronic heart or lung conditions.  No recent medication changes outside of starting a supplement 2 days ago recommended by her psychiatrist.  Has been on Ozempic for 8 months, did not take her full dose most recently.  Used to take Xanax for anxiety, no longer prescribed this.  States this feels somewhat similar to previous panic attacks.  Taking blood pressure medications as directed.  Is on Adderall for ADHD, thinks she may have taken 2 tablets today. No concerns for pregnancy. No syncope or severe, sudden onset headache.      MEDICAL HISTORY     Past Medical History:   Diagnosis Date    Anxiety     Anxiety     Depression     Depressive disorder     GERD (gastroesophageal reflux disease)     " Kidney stone     Psoriasis     PVC's (premature ventricular contractions)     Sleep apnea     Stress incontinence (female) (male)        Past Surgical History:   Procedure Laterality Date    ARTHROSCOPY KNEE WITH MENISCAL REPAIR Left 8/17/2020    Procedure: LEFT KNEE ARTHROSCOPY WITH PARTIAL MEDIAL MENISCECTOMY;  Surgeon: Carmine Jacobsen MD;  Location: [facility];  Service: Orthopedics    CHOLECYSTECTOMY  2015    CYSTOSCOPY N/A 9/8/2016    Procedure: CYSTOSCOPY;  Surgeon: Nish Nayak MD;  Location: [facility];  Service:     HC SLING OPERATION FOR STRESS INCONTINENCE N/A 9/8/2016    Procedure: SOLYX MIDURETHRAL SLING ;  Surgeon: Nish Nayak MD;  Location: [facility] [place];  Service: Gynecology    OTHER SURGICAL HISTORY      wisdom teeth extractions       No family history on file.    Social History     Tobacco Use    Smoking status: Every Day     Current packs/day: 0.50     Types: Cigarettes    Smokeless tobacco: Never    Tobacco comments:     Smokes rarely   Substance Use Topics    Alcohol use: Yes     Comment: Alcoholic Drinks/day: weekkly    Drug use: No       Most Recent Immunizations   Administered Date(s) Administered    COVID-19 Monovalent 18+ (Moderna) 05/04/2021        albuterol (PROAIR HFA/PROVENTIL HFA/VENTOLIN HFA) 108 (90 BASE) MCG/ACT Inhaler  ALPRAZolam (XANAX PO)  azithromycin (ZITHROMAX) 250 MG tablet  benzonatate (TESSALON) 100 MG capsule  guaiFENesin-codeine (ROBITUSSIN AC) 100-10 MG/5ML SOLN solution  omeprazole 20 MG tablet  ORDER FOR DME  venlafaxine (EFFEXOR-ER) 150 MG TB24 24 hr tablet        PHYSICAL EXAM   VITALS:  Patient Vitals for the past 24 hrs:   BP Temp Temp src Pulse Resp SpO2 Height Weight   06/11/25 1925 (!) 142/67 -- -- 98 19 -- -- --   06/11/25 1906 -- -- -- 97 15 -- -- --   06/11/25 1800 123/70 -- -- 112 18 94 % -- --   06/11/25 1745 (!) 149/72 -- -- 114 23 97 % -- --   06/11/25 1729 (!) 143/87 98.5  F (36.9  C) Temporal (!) 128 16 95 %  "1.651 m (5' 5\") 136.1 kg (300 lb)     Body mass index is 49.92 kg/m .     Vitals reviewed. Nursing notes reviewed.  CONSITUTIONAL: Generally well appearing female in no acute distress. Well developed and nourished.   EYES: Conjunctivae clear without exudates or hemorrhage. Sclera non-icteric. EOM grossly intact.   HENT: Normocephalic, atraumatic.  Moist mucous membranes.   NECK: Supple, gross ROM intact.   RESPIRATORY: Unlabored respiratory effort, speaks in full sentences.  Lungs clear to auscultation bilaterally without rhonchi, wheezes, rales.   CARDIO: Tachycardic. Heart rhythm regular without arrhythmia, murmur, or third heart sound.  No lower extremity edema.  Radial pulses 2+ bilaterally.  GI: Soft, nondistended. Abdomen nontender without pulsatile mass.   MSK: Moving all 4 extremities intentionally and without pain. No joint swelling, redness, or obvious deformity.  SKIN: Warm, dry. No rashes or lesions.  NEURO:  AxOx4. CN II-XII grossly intact, but not individually tested. No focal neurological deficits.   PSYCH: Thoughts linear and responses appropriate. Cooperative. Appropriate mood and affect.     LABS & IMAGING   All pertinent labs and imaging reviewed and interpreted.  Results for orders placed or performed during the hospital encounter of 06/11/25   Basic metabolic panel   Result Value Ref Range    Sodium 138 135 - 145 mmol/L    Potassium 3.5 3.4 - 5.3 mmol/L    Chloride 99 98 - 107 mmol/L    Carbon Dioxide (CO2) 26 22 - 29 mmol/L    Anion Gap 13 7 - 15 mmol/L    Urea Nitrogen 9.6 6.0 - 20.0 mg/dL    Creatinine 0.82 0.51 - 0.95 mg/dL    GFR Estimate >90 >60 mL/min/1.73m2    Calcium 9.2 8.8 - 10.4 mg/dL    Glucose 105 (H) 70 - 99 mg/dL   TSH with free T4 reflex   Result Value Ref Range    TSH 1.19 0.30 - 4.20 uIU/mL   Result Value Ref Range    Troponin T, High Sensitivity <6 <=14 ng/L   Result Value Ref Range    Magnesium 2.0 1.7 - 2.3 mg/dL   ECG 12-LEAD WITH MUSE (LHE)   Result Value Ref Range    " Systolic Blood Pressure 141 mmHg    Diastolic Blood Pressure 88 mmHg    Ventricular Rate 117 BPM    Atrial Rate 117 BPM    ME Interval 148 ms    QRS Duration 84 ms     ms    QTc 454 ms    P Axis 62 degrees    R AXIS 56 degrees    T Axis 45 degrees    Interpretation ECG       Sinus tachycardia  Otherwise normal ECG  When compared with ECG of 08-Dec-2012 14:00,  MANUAL COMPARISON REQUIRED PREVIOUS ECG IS INCOMPATIBLE  Confirmed by SEE ED PROVIDER NOTE FOR, ECG INTERPRETATION ([id number]),  HERLINDA SNOW ([id number]) on 6/11/2025 5:59:33 PM       ECG:  Performed at: [facility] EMERGENCY ROOM  Impression: Sinus tachycardia 117 bpm  Comparisons: No significant changes when compared to ECG of 12/8/2012    EKG was collected and independently interpreted by myself and also confirmed by Dr. Dodge, ED MD.    Allie Guzmán PA-C   Emergency Medicine   [facility] EMERGENCY ROOM  [address] [id number]  [phone]  Dept: [phone]     Allie Guzmán PA-C  06/11/25 1954

## 2025-06-12 ENCOUNTER — RESULTS FOLLOW-UP (OUTPATIENT)
Dept: NURSING | Facility: CLINIC | Age: 45
End: 2025-06-12

## 2025-06-12 NOTE — DISCHARGE INSTRUCTIONS
Your emergency department evaluation was reassuring today.    Although I am unsure exactly what caused your symptoms, does not appear to be due to a cause require emergent intervention at this time.    Rest.  Drink plenty of fluids.  Continue to monitor your symptoms.  If they trigger palpitations, limit or avoid alcohol or caffeine.  Do not smoke.    Follow-up with your primary doctor within 5 days for reevaluation.    Call 911 anytime you think you may need emergency care. This may mean having symptoms that suggest your blood pressure is causing a serious heart or blood vessel problem.   For example, call 911 if:  You have symptoms of a heart attack. These may include:  Chest pain or pressure, or a strange feeling in the chest.  Sweating.  Shortness of breath.  Nausea or vomiting.  Pain, pressure, or a strange feeling in the back, neck, jaw, or upper belly or in one or both shoulders or arms.  Lightheadedness or sudden weakness.  You have symptoms of a stroke. These may include:  Sudden numbness, tingling, weakness, or loss of movement in your face, arm, or leg, especially on only one side of your body.  Sudden vision changes.  Sudden trouble speaking.  Sudden confusion or trouble understanding simple statements.  Sudden problems with walking or balance.  A sudden, severe headache that is different from past headaches.  You have severe back or belly pain.